# Patient Record
Sex: FEMALE | Race: WHITE | Employment: FULL TIME | URBAN - METROPOLITAN AREA
[De-identification: names, ages, dates, MRNs, and addresses within clinical notes are randomized per-mention and may not be internally consistent; named-entity substitution may affect disease eponyms.]

---

## 2024-01-20 ENCOUNTER — OFFICE VISIT (OUTPATIENT)
Dept: FAMILY MEDICINE CLINIC | Facility: CLINIC | Age: 44
End: 2024-01-20
Payer: COMMERCIAL

## 2024-01-20 VITALS
OXYGEN SATURATION: 97 % | HEART RATE: 82 BPM | WEIGHT: 169 LBS | BODY MASS INDEX: 27.16 KG/M2 | DIASTOLIC BLOOD PRESSURE: 62 MMHG | HEIGHT: 66 IN | TEMPERATURE: 98 F | RESPIRATION RATE: 18 BRPM | SYSTOLIC BLOOD PRESSURE: 112 MMHG

## 2024-01-20 DIAGNOSIS — J20.9 ACUTE BRONCHITIS, UNSPECIFIED ORGANISM: Primary | ICD-10-CM

## 2024-01-20 PROCEDURE — 87635 SARS-COV-2 COVID-19 AMP PRB: CPT | Performed by: NURSE PRACTITIONER

## 2024-01-20 RX ORDER — ALBUTEROL SULFATE 90 UG/1
AEROSOL, METERED RESPIRATORY (INHALATION)
Qty: 1 EACH | Refills: 0 | Status: SHIPPED | OUTPATIENT
Start: 2024-01-20

## 2024-01-20 RX ORDER — BENZONATATE 200 MG/1
CAPSULE ORAL
Qty: 20 CAPSULE | Refills: 0 | Status: SHIPPED | OUTPATIENT
Start: 2024-01-20

## 2024-01-20 NOTE — PROGRESS NOTES
CHIEF COMPLAINT:     Chief Complaint   Patient presents with    Cough     Deep cough for weeks and tight chest minor sore throat. - Entered by patient       HPI:   Roselia Patrick is a 44 year old female who presents for upper respiratory symptoms for 1 month. Patient reports deep cough, mucous in throat, intermittent chest tightness.  New mild sore throat, sinus congestion, some body aches that are newer x2 days. Cough worst at night.  Treating symptoms with Ibuprofen, robitussin.  Denies fever, dyspnea, wheezing, SOB, chest pain, N/V/D, ear pain, abd pain, or rash.  Denies history of asthma (just sports-induced as a kid) smoking, pneumonia, or COPD.  Hx of GERD but it has not been flaring.  No known ill contacts recently.  Denies recent travel.    Current Outpatient Medications   Medication Sig Dispense Refill    albuterol 108 (90 Base) MCG/ACT Inhalation Aero Soln Inhale 2 puffs every 4-6 hours PRN wheezing 1 each 0    benzonatate 200 MG Oral Cap Take 1 capsule PO every 8 hours PRN cough 20 capsule 0    omeprazole 20 MG Oral Capsule Delayed Release Take 1 capsule (20 mg total) by mouth every morning before breakfast. 90 capsule 0      History reviewed. No pertinent past medical history.   Past Surgical History:   Procedure Laterality Date    OTHER SURGICAL HISTORY      congenital hip dysplasia         Social History     Socioeconomic History    Marital status: Single   Tobacco Use    Smoking status: Never    Smokeless tobacco: Never   Substance and Sexual Activity    Alcohol use: Yes     Comment: social         REVIEW OF SYSTEMS:   GENERAL: feels well otherwise, normal appetite  SKIN: no rashes or abnormal skin lesions  HEENT: See HPI  LUNGS: denies shortness of breath or wheezing, See HPI  CARDIOVASCULAR: denies chest pain or palpitations   GI: denies N/V/C or abdominal pain  NEURO: Denies headaches    EXAM:   /62   Pulse 82   Temp 98 °F (36.7 °C)   Resp 18   Ht 5' 6\" (1.676 m)   Wt 169 lb (76.7  kg)   LMP 12/31/2023 (Exact Date)   SpO2 97%   BMI 27.28 kg/m²   GENERAL: well developed, well nourished, and in no apparent distress  SKIN: no rashes, no suspicious lesions  HEAD: atraumatic, normocephalic.  No tenderness on palpation of maxillary or frontal sinuses.  EYES: conjunctiva clear, EOM intact  EARS: TM's normal, no bulging, no retraction,no fluid, bony landmarks visible  NOSE: Nostrils patent, +small nasal discharge, nasal mucosa pink and +inflamed  THROAT: Oral mucosa pink, moist. Posterior pharynx is +mildly erythematous. No exudates.   NECK: Supple, non-tender  LUNGS: clear to auscultation bilaterally, no wheezes or rhonchi. Breathing is non labored. +Productive cough- occasional.  CARDIO: RRR without murmur.  LYMPH: No lymphadenopathy.        ASSESSMENT AND PLAN:   Roselia Patrick is a 44 year old female who presents with     ASSESSMENT:   Encounter Diagnosis   Name Primary?    Acute bronchitis, unspecified organism Yes       PLAN:   - Meds as below.    - COVID PCR sent to lab.  Cough x1 month but newer URI type symptoms the past couple days, suspect new virus.  - Comfort care as described in Patient Instructions  - Advised F/U visit if no improvement/worsening within 5 days; sooner if new fever or worsening breathing.    - Proceed to ER if ever dyspnea, chest pain, SOB, dehydration.  - Pt verbalizes understanding and is agreeable w/ plan.    Meds & Refills for this Visit:  Requested Prescriptions     Signed Prescriptions Disp Refills    albuterol 108 (90 Base) MCG/ACT Inhalation Aero Soln 1 each 0     Sig: Inhale 2 puffs every 4-6 hours PRN wheezing    benzonatate 200 MG Oral Cap 20 capsule 0     Sig: Take 1 capsule PO every 8 hours PRN cough       Risks, benefits, and side effects of medication explained and discussed.  Pt verbalizes understanding.    There are no Patient Instructions on file for this visit.

## 2024-01-21 LAB — SARS-COV-2 RNA RESP QL NAA+PROBE: NOT DETECTED

## 2024-02-21 ENCOUNTER — OFFICE VISIT (OUTPATIENT)
Dept: FAMILY MEDICINE CLINIC | Facility: CLINIC | Age: 44
End: 2024-02-21
Payer: COMMERCIAL

## 2024-02-21 VITALS
TEMPERATURE: 98 F | DIASTOLIC BLOOD PRESSURE: 72 MMHG | WEIGHT: 166 LBS | OXYGEN SATURATION: 97 % | SYSTOLIC BLOOD PRESSURE: 118 MMHG | HEART RATE: 75 BPM | BODY MASS INDEX: 26.68 KG/M2 | HEIGHT: 66 IN | RESPIRATION RATE: 16 BRPM

## 2024-02-21 DIAGNOSIS — J02.0 STREP PHARYNGITIS: ICD-10-CM

## 2024-02-21 DIAGNOSIS — R05.2 SUBACUTE COUGH: Primary | ICD-10-CM

## 2024-02-21 LAB
CONTROL LINE PRESENT WITH A CLEAR BACKGROUND (YES/NO): YES YES/NO
KIT LOT #: ABNORMAL NUMERIC
STREP GRP A CUL-SCR: POSITIVE

## 2024-02-21 PROCEDURE — 3074F SYST BP LT 130 MM HG: CPT | Performed by: NURSE PRACTITIONER

## 2024-02-21 PROCEDURE — 3078F DIAST BP <80 MM HG: CPT | Performed by: NURSE PRACTITIONER

## 2024-02-21 PROCEDURE — 99213 OFFICE O/P EST LOW 20 MIN: CPT | Performed by: NURSE PRACTITIONER

## 2024-02-21 PROCEDURE — 87880 STREP A ASSAY W/OPTIC: CPT | Performed by: NURSE PRACTITIONER

## 2024-02-21 PROCEDURE — 3008F BODY MASS INDEX DOCD: CPT | Performed by: NURSE PRACTITIONER

## 2024-02-21 RX ORDER — CEFDINIR 300 MG/1
300 CAPSULE ORAL 2 TIMES DAILY
Qty: 20 CAPSULE | Refills: 0 | Status: SHIPPED
Start: 2024-02-21 | End: 2024-02-21

## 2024-02-21 RX ORDER — CEFDINIR 300 MG/1
300 CAPSULE ORAL 2 TIMES DAILY
Qty: 20 CAPSULE | Refills: 0 | Status: SHIPPED
Start: 2024-02-21 | End: 2024-03-02

## 2024-02-21 NOTE — PROGRESS NOTES
CHIEF COMPLAINT:     Chief Complaint   Patient presents with    Sore Throat     Sx 1.5 weeks - ST, dry cough, L ear pressure, swollen throat glands  Sx Sunday - fever of 101, chills, sweats  Denies SOB, chest pain  No Covid test was done   OTC Zyrtec, Tylenol       HPI:   Roselia Patrick is a 44 year old female presents to clinic with complaint of sore throat. Has been sick with cough/URI symptoms for about the past month.  Seen in [facility] for bronchitis on 1/20 and prescribed benzonatate and albuterol.  Ended up worsening and was then prescribed Augmentin by different urgent care for ear infection.  Seemed to improve but still had some cough.  Then worsened again 1 weeks ago c/o predominant sore throat, still with dry cough, new white exudate, swollen glands, and temp up to 101F.  Denies dyspnea, wheezing, sob, chest pain, GI complaints, ear pain, or rashes.  Tolerating PO.  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  Denies hx of asthma (except sports induced), pneumonia, or copd.  Hasn't taken recent covid test, last hx of covid around a year ago.    Current Outpatient Medications   Medication Sig Dispense Refill    cefdinir 300 MG Oral Cap Take 1 capsule (300 mg total) by mouth 2 (two) times daily for 10 days. 20 capsule 0    albuterol 108 (90 Base) MCG/ACT Inhalation Aero Soln Inhale 2 puffs every 4-6 hours PRN wheezing 1 each 0    omeprazole 20 MG Oral Capsule Delayed Release Take 1 capsule (20 mg total) by mouth every morning before breakfast. 90 capsule 0      History reviewed. No pertinent past medical history.   Social History:  Social History     Socioeconomic History    Marital status: Single   Tobacco Use    Smoking status: Never    Smokeless tobacco: Never   Substance and Sexual Activity    Alcohol use: Yes     Comment: social        REVIEW OF SYSTEMS:   GENERAL HEALTH: feels well otherwise, normal appetite  SKIN: denies any unusual skin lesions or rashes  HEENT: denies ear pain or  difficulty swallowing/eating. See HPI  RESPIRATORY: denies shortness of breath or wheezing  CARDIOVASCULAR: denies chest pain or palpitations   GI: denies vomiting or diarrhea  NEURO: denies dizziness or lightheadedness    EXAM:   /72   Pulse 75   Temp 98.2 °F (36.8 °C)   Resp 16   Ht 5' 6\" (1.676 m)   Wt 166 lb (75.3 kg)   LMP 01/30/2024 (Exact Date)   SpO2 97%   BMI 26.79 kg/m²   GENERAL: well developed, well nourished, in no apparent distress  SKIN: no rashes, no suspicious lesions  HEAD: atraumatic, normocephalic  EYES: conjunctiva clear, EOM intact  EARS: TM's clear, non-injected, no bulging, retraction, or fluid bilaterally  NOSE: nostrils patent, no exudates, nasal mucosa pink and noninflamed  THROAT: oral mucosa pink, moist. +Posterior pharynx erythematous and injected. Tonsils 1+/4.  +Scant white exudates. Uvula midline.  NECK: supple, non-tender  LUNGS: clear to auscultation bilaterally, no wheezes or rhonchi. Breathing is non labored. +Dry cough.  CARDIO: RRR without murmur  LYMPH: No lymphadenopathy.    Recent Results (from the past 24 hour(s))   Strep A Assay W/Optic    Collection Time: 02/21/24 12:58 PM   Result Value Ref Range    Strep Grp A Screen Positive Negative    Control Line Present with a clear background (yes/no) Yes Yes/No    Kit Lot # [id number] Numeric    Kit Expiration Date 4/22/25 Date         ASSESSMENT AND PLAN:   Assessment:   1. Strep Pharyngitis: Rapid strep screen is negative   2. Subacute cough    Plan:   - Positive rapid strep.  - Start Cefdinir x10 days.  - Lingering cough appears unrelated to strep- ressured lungs clear, vitals/O2 sat, and breathing stable.  Follow up with PCP if no gradual improvement on this within 1-2 weeks and sooner if new fever or worsening breathing   - Comfort measures explained and discussed as listed in Patient Instructions.  - Contagious x24 hours, change toothbrush in 2 days.  - Advised follow up in 3-5 days if not improving, condition  worsens, or new/persistent fevers.  - Pt verbalizes understanding and is agreeable w/ plan.    There are no Patient Instructions on file for this visit.